# Patient Record
Sex: MALE | Race: BLACK OR AFRICAN AMERICAN | Employment: PART TIME | URBAN - METROPOLITAN AREA
[De-identification: names, ages, dates, MRNs, and addresses within clinical notes are randomized per-mention and may not be internally consistent; named-entity substitution may affect disease eponyms.]

---

## 2017-10-26 ENCOUNTER — APPOINTMENT (OUTPATIENT)
Dept: GENERAL RADIOLOGY | Age: 37
End: 2017-10-26
Attending: PHYSICIAN ASSISTANT
Payer: SELF-PAY

## 2017-10-26 ENCOUNTER — HOSPITAL ENCOUNTER (EMERGENCY)
Age: 37
Discharge: HOME | End: 2017-10-26
Attending: STUDENT IN AN ORGANIZED HEALTH CARE EDUCATION/TRAINING PROGRAM
Payer: SELF-PAY

## 2017-10-26 VITALS
SYSTOLIC BLOOD PRESSURE: 117 MMHG | RESPIRATION RATE: 16 BRPM | TEMPERATURE: 98.1 F | OXYGEN SATURATION: 99 % | HEIGHT: 71 IN | HEART RATE: 55 BPM | BODY MASS INDEX: 22.4 KG/M2 | DIASTOLIC BLOOD PRESSURE: 69 MMHG | WEIGHT: 160 LBS
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
## 2017-10-26 DIAGNOSIS — S16.1XXA STRAIN OF NECK MUSCLE, INITIAL ENCOUNTER: ICD-10-CM

## 2017-10-26 DIAGNOSIS — S39.012A BACK STRAIN, INITIAL ENCOUNTER: ICD-10-CM

## 2017-10-26 DIAGNOSIS — S86.912A STRAIN OF KNEE, LEFT, INITIAL ENCOUNTER: ICD-10-CM

## 2017-10-26 PROCEDURE — 72020 X-RAY EXAM OF SPINE 1 VIEW: CPT

## 2017-10-26 PROCEDURE — 72050 X-RAY EXAM NECK SPINE 4/5VWS: CPT

## 2017-10-26 PROCEDURE — L0172 CERV COL SR FOAM 2PC PRE OTS: HCPCS

## 2017-10-26 PROCEDURE — 99283 EMERGENCY DEPT VISIT LOW MDM: CPT

## 2017-10-26 PROCEDURE — 73562 X-RAY EXAM OF KNEE 3: CPT

## 2017-10-26 PROCEDURE — 72100 X-RAY EXAM L-S SPINE 2/3 VWS: CPT

## 2017-10-26 RX ORDER — DIAZEPAM 5 MG/1
5 TABLET ORAL
Qty: 15 TAB | Refills: 0 | Status: SHIPPED | OUTPATIENT
Start: 2017-10-26

## 2017-10-26 RX ORDER — TRAMADOL HYDROCHLORIDE 50 MG/1
50 TABLET ORAL
Qty: 20 TAB | Refills: 0 | Status: SHIPPED | OUTPATIENT
Start: 2017-10-26

## 2017-10-26 RX ORDER — NAPROXEN 500 MG/1
500 TABLET ORAL
Qty: 20 TAB | Refills: 0 | Status: SHIPPED | OUTPATIENT
Start: 2017-10-26

## 2017-10-26 NOTE — LETTER
[address] 
[address] [facility] 7 [id number] 
[phone] Work/School Note Date: 10/26/2017 To Whom It May concern: 
 
Elvis Washington was seen and treated today in the emergency room by the following provider(s): 
Attending Provider: Diogenes Vance MD 
Physician Assistant: VANESSA Alvarez. Elvis Washington may return to work on Monday; sooner if symptoms improve. Sincerely, VANESSA Alvarez

## 2017-10-27 NOTE — ED NOTES
C-collar removed by PA. Patient verbalizes understanding of discharge instructions. Ambulatory and in no acute distress at discharge.
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
## 2023-02-28 ENCOUNTER — HOSPITAL ENCOUNTER (EMERGENCY)
Age: 43
Discharge: HOME | End: 2023-02-28
Attending: EMERGENCY MEDICINE
Payer: MEDICAID

## 2023-02-28 ENCOUNTER — APPOINTMENT (OUTPATIENT)
Dept: GENERAL RADIOLOGY | Age: 43
End: 2023-02-28
Attending: EMERGENCY MEDICINE
Payer: MEDICAID

## 2023-02-28 VITALS
HEIGHT: 71 IN | BODY MASS INDEX: 23.52 KG/M2 | SYSTOLIC BLOOD PRESSURE: 119 MMHG | HEART RATE: 62 BPM | RESPIRATION RATE: 18 BRPM | TEMPERATURE: 98.4 F | OXYGEN SATURATION: 100 % | WEIGHT: 168 LBS | DIASTOLIC BLOOD PRESSURE: 45 MMHG

## 2023-02-28 DIAGNOSIS — S92.101A CLOSED NONDISPLACED FRACTURE OF RIGHT TALUS, UNSPECIFIED PORTION OF TALUS, INITIAL ENCOUNTER: Primary | ICD-10-CM

## 2023-02-28 PROCEDURE — 99283 EMERGENCY DEPT VISIT LOW MDM: CPT

## 2023-02-28 PROCEDURE — 74011250637 HC RX REV CODE- 250/637: Performed by: EMERGENCY MEDICINE

## 2023-02-28 PROCEDURE — 73630 X-RAY EXAM OF FOOT: CPT

## 2023-02-28 RX ORDER — IBUPROFEN 400 MG/1
800 TABLET ORAL ONCE
Status: COMPLETED | OUTPATIENT
Start: 2023-02-28 | End: 2023-02-28

## 2023-02-28 RX ADMIN — IBUPROFEN 800 MG: 400 TABLET ORAL at 18:42

## 2023-02-28 NOTE — ED NOTES
Patient denies injury or trauma. Pulses palpable. Sensation in tact. Patient able to bare weight. Patient alert and oriented x4, respirations even and unlabored, skin warm dry and intact, NAD. Emergency Department Nursing Plan of Care       The Nursing Plan of Care is developed from the Nursing assessment and Emergency Department Attending provider initial evaluation. The plan of care may be reviewed in the ED Provider note.     The Plan of Care was developed with the following considerations:   Patient / Family readiness to learn indicated by:verbalized understanding, successful return demonstration and appropriate questions asked  Persons(s) to be included in education: patient  Barriers to Learning/Limitations:No    Signed     Evangelist Rutledge RN    2/28/2023   6:35 PM

## 2023-02-28 NOTE — ED PROVIDER NOTES
[facility] EMERGENCY DEPT  EMERGENCY DEPARTMENT ENCOUNTER       Pt Name: Miguel Espinal  MRN: [id number]  [place] 1980  Date of evaluation: 2/28/2023  Provider: Rosie Bennett MD   PCP: None  Note Started: 6:34 PM 2/28/23     CHIEF COMPLAINT       Chief Complaint   Patient presents with    Foot Pain        HISTORY OF PRESENT ILLNESS: 1 or more elements      History From: patient, History limited by: none     Miguel Espinal is a 40 y.o. male who presents with a chief complaint of right foot pain that has been ongoing for 2 months. Please See MDM for Additional Details of the HPI/PMH  Nursing Notes were all reviewed and agreed with or any disagreements were addressed in the HPI. REVIEW OF SYSTEMS        Positives and Pertinent negatives as per HPI. PAST HISTORY     Past Medical History:  History reviewed. No pertinent past medical history. Past Surgical History:  History reviewed. No pertinent surgical history. Family History:  History reviewed. No pertinent family history. Social History:  Social History     Tobacco Use    Smoking status: Former     Packs/day: 1.00     Types: Cigarettes    Smokeless tobacco: Never   Vaping Use    Vaping Use: Never used   Substance Use Topics    Alcohol use: Yes     Comment: socially    Drug use: Yes     Types: Marijuana, Prescription     Comment: percocet(not his own)       Allergies:  No Known Allergies    CURRENT MEDICATIONS      Discharge Medication List as of 2/28/2023  8:32 PM        CONTINUE these medications which have NOT CHANGED    Details   naproxen (NAPROSYN) 500 mg tablet Take 1 Tab by mouth every twelve (12) hours as needed for Pain., Print, Disp-20 Tab, R-0      traMADol (ULTRAM) 50 mg tablet Take 1 Tab by mouth every six (6) hours as needed for Pain. Max Daily Amount: 200 mg., Print, Disp-20 Tab, R-0      diazePAM (VALIUM) 5 mg tablet Take 1 Tab by mouth every eight (8) hours as needed (spasm).  Max Daily Amount: 15 mg., Print, Disp-15 Tab, R-0      ibuprofen (MOTRIN) 800 mg tablet Take 1 Tab by mouth every six (6) hours as needed for Pain. Print, 800 mg, Disp-20 Tab, R-0      cyclobenzaprine (FLEXERIL) 10 mg tablet Take 1 Tab by mouth three (3) times daily as needed for Muscle Spasm(s). Print, 10 mg, Disp-20 Tab, R-0             SCREENINGS               No data recorded         PHYSICAL EXAM      ED Triage Vitals [02/28/23 1803]   ED Encounter Vitals Group      BP (!) 119/45      Pulse (Heart Rate) 62      Resp Rate 18      Temp 98.4 °F (36.9 °C)      Temp src       O2 Sat (%) 100 %      Weight 168 lb      Height 5' 11\"        Physical Exam  Vitals and nursing note reviewed. Constitutional:       General: He is not in acute distress. Appearance: He is well-developed. HENT:      Head: Normocephalic and atraumatic. Eyes:      Conjunctiva/sclera: Conjunctivae normal.      Pupils: Pupils are equal, round, and reactive to light. Cardiovascular:      Rate and Rhythm: Normal rate. Pulmonary:      Effort: Pulmonary effort is normal. No respiratory distress. Musculoskeletal:         General: Normal range of motion. Cervical back: Normal range of motion. Comments: Right foot appears to have some chronic deformities. There is swelling to the right lateral malleolus with a callus to the lateral aspect of the foot. 2+ DP pulse. Skin:     General: Skin is warm and dry. Neurological:      Mental Status: He is alert and oriented to person, place, and time. Psychiatric:         Mood and Affect: Mood normal.        DIAGNOSTIC RESULTS   LABS:     No results found for this or any previous visit (from the past 12 hour(s)). EKG:  If performed, independent interpretation documented below in the MDM section     RADIOLOGY:  Non-plain film images such as CT, Ultrasound and MRI are read by the radiologist. Plain radiographic images are visualized and preliminarily interpreted by the ED Provider with the findings documented in the MDM section. Interpretation per the Radiologist below, if available at the time of this note:     XR FOOT RT MIN 3 V    Result Date: 2/28/2023  EXAM: XR FOOT RT MIN 3 V INDICATION: foot pain. COMPARISON: None. FINDINGS: Three views of the right foot demonstrate an age indeterminant minimally displaced fracture of the talus, which may be subacute. No other acute fractures are identified. The soft tissues are within normal limits. 1. Age-indeterminate minimally displaced fracture of the talus, which may be subacute. Correlate for point tenderness. PROCEDURES   Unless otherwise noted below, none  Procedures     CRITICAL CARE TIME   none    EMERGENCY DEPARTMENT COURSE and DIFFERENTIAL DIAGNOSIS/MDM   Vitals:    Vitals:    02/28/23 1803   BP: (!) 119/45   Pulse: 62   Resp: 18   Temp: 98.4 °F (36.9 °C)   SpO2: 100%   Weight: 76.2 kg (168 lb)   Height: 5' 11\" (1.803 m)        Patient was given the following medications:  Medications   ibuprofen (MOTRIN) tablet 800 mg (800 mg Oral Given 2/28/23 1842)       Medical Decision Making  81-year-old male who presents with a chief complaint of right foot pain and swelling that has been ongoing for the last 2 months. Denies any trauma or falls. 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 He states he is always intermittently had pain in the foot but for the last 2 months it has been constant which is unusual.  He has not seen his doctor. He is not taking any medications for symptoms. Denies any history of gout. No history of clot. Denies any other symptoms. No fever, chest pain, shortness of breath. On exam, the patient is overall well-appearing with stable vital signs. The foot does appear chronically malformed with some swelling over the lateral malleolus with associated tenderness. Additionally has a callus over the lateral aspect of the foot.   When I observed the patient ambulating he does ambulate with an unusual gait with most of the weight on the outer aspect of his foot which I suspect is likely the cause of his pain. He does not follow with a podiatrist or an orthopedist.  We will get an x-ray of his foot given ongoing pain by very low suspicion for fracture given no reports of trauma. I think he will likely benefit from following up with a podiatrist for either custom foot wear orthotics to help with his gait which will likely help with his foot pain. Problems Addressed:  Closed nondisplaced fracture of right talus, unspecified portion of talus, initial encounter: chronic illness or injury    Amount and/or Complexity of Data Reviewed  Radiology: ordered. Decision-making details documented in ED Course. Risk  Prescription drug management. ED Course as of 02/28/23 2215 Tue Feb 28, 2023 2030 X-ray shows an age-indeterminate minimally displaced fracture of the talus. Of note patient has had pain for over 2 months it is very unlikely that this represents an acute fracture. We will place in a walking boot and have the patient follow-up with orthopedics. [GABI]      ED Course User Index  El Westfall MD         FINAL IMPRESSION     1. Closed nondisplaced fracture of right talus, unspecified portion of talus, initial encounter          DISPOSITION/PLAN   Moises Harp's  results have been reviewed with him. He has been counseled regarding his diagnosis, treatment, and plan. He verbally conveys understanding and agreement of the signs, symptoms, diagnosis, treatment and prognosis and additionally agrees to follow up as discussed. He also agrees with the care-plan and conveys that all of his questions have been answered. I have also provided discharge instructions for him that include: educational information regarding their diagnosis and treatment, and list of reasons why they would want to return to the ED prior to their follow-up appointment, should his condition change.      CLINICAL IMPRESSION    Discharge Note: The patient is stable for discharge home. The signs, symptoms, diagnosis, and discharge instructions have been discussed, understanding conveyed, and agreed upon. The patient is to follow up as recommended or return to ER should their symptoms worsen. PATIENT REFERRED TO:  Follow-up Information       Follow up With Specialties Details Why Contact [facility] Orthopedic Surgery   [address]    [address]  [facility] [address] Chema Good  [phone]    Shari Quintana DPM Podiatry   [place] [address]  [phone]                DISCHARGE MEDICATIONS:  Discharge Medication List as of 2/28/2023  8:32 PM            DISCONTINUED MEDICATIONS:  Discharge Medication List as of 2/28/2023  8:32 PM          I am the Primary Clinician of Record. Alba Bill MD (electronically signed)    (Please note that parts of this dictation were completed with voice recognition software. Quite often unanticipated grammatical, syntax, homophones, and other interpretive errors are inadvertently transcribed by the computer software. Please disregards these errors.  Please excuse any errors that have escaped final proofreading.)

## 2023-02-28 NOTE — ED NOTES
Bedside, Verbal and Written shift change report given to ANABEL Samayoa (oncoming nurse) by Leann Roman RN (offgoing nurse).  Report included the following information SBAR, Kardex, ED Summary and [facility]

## 2023-02-28 NOTE — ED TRIAGE NOTES
Pt arrives with c/o R foot pain worsening over thee past few weeks, pt has R club foot x birth and states it never bothered him this bad. Pt ambulatory in ED.